# Patient Record
Sex: MALE | Race: WHITE
[De-identification: names, ages, dates, MRNs, and addresses within clinical notes are randomized per-mention and may not be internally consistent; named-entity substitution may affect disease eponyms.]

---

## 2019-01-02 ENCOUNTER — HOSPITAL ENCOUNTER (EMERGENCY)
Dept: HOSPITAL 10 - E/R | Age: 39
Discharge: HOME | End: 2019-01-02
Payer: MEDICAID

## 2019-01-02 ENCOUNTER — HOSPITAL ENCOUNTER (EMERGENCY)
Dept: HOSPITAL 91 - E/R | Age: 39
Discharge: HOME | End: 2019-01-02
Payer: MEDICAID

## 2019-01-02 VITALS
HEIGHT: 60 IN | WEIGHT: 132.28 LBS | BODY MASS INDEX: 25.97 KG/M2 | HEIGHT: 60 IN | WEIGHT: 132.28 LBS | BODY MASS INDEX: 25.97 KG/M2

## 2019-01-02 VITALS — SYSTOLIC BLOOD PRESSURE: 137 MMHG | RESPIRATION RATE: 16 BRPM | HEART RATE: 97 BPM | DIASTOLIC BLOOD PRESSURE: 83 MMHG

## 2019-01-02 DIAGNOSIS — R40.2142: ICD-10-CM

## 2019-01-02 DIAGNOSIS — F17.210: ICD-10-CM

## 2019-01-02 DIAGNOSIS — R40.2362: ICD-10-CM

## 2019-01-02 DIAGNOSIS — F10.20: ICD-10-CM

## 2019-01-02 DIAGNOSIS — F41.9: ICD-10-CM

## 2019-01-02 DIAGNOSIS — R00.0: Primary | ICD-10-CM

## 2019-01-02 DIAGNOSIS — R40.2252: ICD-10-CM

## 2019-01-02 LAB
ADD MAN DIFF?: NO
ALANINE AMINOTRANSFERASE: 25 IU/L (ref 13–69)
ALBUMIN/GLOBULIN RATIO: 1.82
ALBUMIN: 5.3 G/DL (ref 3.3–4.9)
ALKALINE PHOSPHATASE: 65 IU/L (ref 42–121)
ANION GAP: 15 (ref 5–13)
ASPARTATE AMINO TRANSFERASE: 24 IU/L (ref 15–46)
BASOPHIL #: 0 10^3/UL (ref 0–0.1)
BASOPHILS %: 0.3 % (ref 0–2)
BILIRUBIN,DIRECT: 0 MG/DL (ref 0–0.2)
BILIRUBIN,TOTAL: 0.4 MG/DL (ref 0.2–1.3)
BLOOD UREA NITROGEN: 17 MG/DL (ref 7–20)
CALCIUM: 9.9 MG/DL (ref 8.4–10.2)
CARBON DIOXIDE: 29 MMOL/L (ref 21–31)
CHLORIDE: 103 MMOL/L (ref 97–110)
CREATININE: 0.72 MG/DL (ref 0.61–1.24)
EOSINOPHILS #: 0.1 10^3/UL (ref 0–0.5)
EOSINOPHILS %: 1.3 % (ref 0–7)
GLOBULIN: 2.9 G/DL (ref 1.3–3.2)
GLUCOSE: 88 MG/DL (ref 70–220)
HEMATOCRIT: 44.5 % (ref 42–52)
HEMOGLOBIN: 15 G/DL (ref 14–18)
IMMATURE GRANS #M: 0.01 10^3/UL (ref 0–0.03)
IMMATURE GRANS % (M): 0.1 % (ref 0–0.43)
LYMPHOCYTES #: 1.3 10^3/UL (ref 0.8–2.9)
LYMPHOCYTES %: 18.1 % (ref 15–51)
MEAN CORPUSCULAR HEMOGLOBIN: 29.1 PG (ref 29–33)
MEAN CORPUSCULAR HGB CONC: 33.7 G/DL (ref 32–37)
MEAN CORPUSCULAR VOLUME: 86.4 FL (ref 82–101)
MEAN PLATELET VOLUME: 9.5 FL (ref 7.4–10.4)
MONOCYTE #: 0.4 10^3/UL (ref 0.3–0.9)
MONOCYTES %: 5.2 % (ref 0–11)
NEUTROPHIL #: 5.4 10^3/UL (ref 1.6–7.5)
NEUTROPHILS %: 75 % (ref 39–77)
NUCLEATED RED BLOOD CELLS #: 0 10^3/UL (ref 0–0)
NUCLEATED RED BLOOD CELLS%: 0 /100WBC (ref 0–0)
PLATELET COUNT: 276 10^3/UL (ref 140–415)
POTASSIUM: 4.2 MMOL/L (ref 3.5–5.1)
RED BLOOD COUNT: 5.15 10^6/UL (ref 4.7–6.1)
RED CELL DISTRIBUTION WIDTH: 12.6 % (ref 11.5–14.5)
SODIUM: 147 MMOL/L (ref 135–144)
TOTAL PROTEIN: 8.2 G/DL (ref 6.1–8.1)
TROPONIN-I: < 0.012 NG/ML (ref 0–0.12)
WHITE BLOOD COUNT: 7.2 10^3/UL (ref 4.8–10.8)

## 2019-01-02 PROCEDURE — 84484 ASSAY OF TROPONIN QUANT: CPT

## 2019-01-02 PROCEDURE — 36415 COLL VENOUS BLD VENIPUNCTURE: CPT

## 2019-01-02 PROCEDURE — 71045 X-RAY EXAM CHEST 1 VIEW: CPT

## 2019-01-02 PROCEDURE — 93005 ELECTROCARDIOGRAM TRACING: CPT

## 2019-01-02 PROCEDURE — 80053 COMPREHEN METABOLIC PANEL: CPT

## 2019-01-02 PROCEDURE — 85025 COMPLETE CBC W/AUTO DIFF WBC: CPT

## 2019-01-02 PROCEDURE — 99285 EMERGENCY DEPT VISIT HI MDM: CPT

## 2019-01-02 NOTE — ERD
ER Documentation


Chief Complaint


Chief Complaint





PALPITATIONS





HPI


This is a 38-year-old male with no significant past medical history who is 


presenting with palpitations.  The patient reports drinking several beers for 


New Year's Jennifer and for New Year's.  He does admit to alcohol intoxication last 


night.  When the patient woke up this morning he had a transient few minute 


episode of palpitations.  It quickly resolved, and the patient has not had any 


subsequent episodes.  The patient denies any chest pain or trouble breathing.  H


e denies lightheadedness or dizziness.  He denies nausea or vomiting.  The 


patient does also endorse mild anxiety related to stress in his life.  He does 


not elaborate further.





The patient denies feeling sick recently.  The patient denies fever or chills.  


The patient has had no headache or vision changes.  The patient does not endorse


neck or back pain.  The patient denies abdominal pain.  The patient denies 


changes to bowel movements or urination.  The patient has had no focal deficits.


 The patient has had no weakness or numbness or tingling to the face or 


extremities.





ROS


All systems reviewed and are negative except as per history of present illness.





Medications


Home Meds


Discontinued Scripts


Ondansetron Hcl* (Zofran*) 4 Mg Tablet, 4 MG PO Q8H PRN for NAUSEA AND/OR 


VOMITING, #30 TAB


   Prov:INDUJANAKGEOVANNI         11/29/18


Docusate Sodium* (Colace*) 100 Mg Capsule, 100 MG PO DAILY PRN for CONSTIPATION,


#30 CAP


   Prov:INDUJANAKGEOVANNI         11/29/18


Ibuprofen* (Motrin*) 600 Mg Tab, 600 MG PO Q6H PRN for PAIN AND OR ELEVATED 


TEMP, #30 TAB


   Prov:GEOVANNI MCGHEE         11/29/18


Lorazepam* (Lorazepam*) 1 Mg Tablet, 1 MG PO Q8H PRN for ANXIETY, #10 TAB


   Prov:SAKSHI ALTAMIRANO DO         5/7/18


Ondansetron (Ondansetron Odt) 4 Mg Tab.rapdis, 4 MG PO Q6H PRN for NAUSEA AND/OR


VOMITING, #10 TAB


   Prov:LEPEYTONOSRICHARDSTTYRAS A. DO         5/7/18





Allergies


Allergies:  


Coded Allergies:  


     No Known Allergy (Unverified , 1/2/19)





PMhx/Soc


Medical and Surgical Hx:  pt denies Medical Hx, pt denies Surgical Hx


History of Surgery:  No


Anesthesia Reaction:  No


Hx Neurological Disorder:  No


Hx Respiratory Disorders:  No


Hx Cardiac Disorders:  No


Hx Psychiatric Problems:  No


Hx Miscellaneous Medical Probl:  No


Hx Alcohol Use:  Yes (Occasional)


Hx Substance Use:  No


Hx Tobacco Use:  No


Smoking Status:  Current every day smoker





FmHx


Family History:  No diabetes





Physical Exam


Vitals





Vital Signs


  Date      Temp  Pulse  Resp  B/P (MAP)   Pulse Ox  O2          O2 Flow    FiO2


Time                                                 Delivery    Rate


    1/2/19           88    12      126/85        97  Room Air


     11:10                           (99)


    1/2/19  98.1    110    18      143/77        99


     10:18                           (99)





Physical Exam


Const:   No apparent distress, well-developed, well-nourished


Head:   Normocephalic, Atraumatic 


Eyes:   Normal Conjunctiva.  Extraocular movements intact. Pupils equal, round 


and reactive to light


ENT:   Normal External Ears, Nose and Mouth.


Neck:   Full range of motion. No meningismus.


Resp:   Clear to auscultation bilaterally, No wheezes, rales or rhonchi


Cardio:   Regular rate and rhythm. No murmurs, rubs or gallops


Abd:   Soft, non tender, non distended. Normal bowel sounds


Skin:   No petechiae or rashes


Back:   No midline tenderness. No CVA tenderness


Ext:   No cyanosis, or edema


Neur:   Awake and alert, oriented 4.  Cranial nerves intact.  No facial droop. 


Normal strength, sensation and coordination.


Psych:   Normal Mood and Affect


Result Diagram:  


1/2/19 1108                                                                     


          1/2/19 1108





Results 24 hrs





Laboratory Tests


              Test
                                  1/2/19
11:08


              White Blood Count                      7.2 10^3/ul


              Red Blood Count                       5.15 10^6/ul


              Hemoglobin                               15.0 g/dl


              Hematocrit                                  44.5 %


              Mean Corpuscular Volume                    86.4 fl


              Mean Corpuscular Hemoglobin                29.1 pg


              Mean Corpuscular Hemoglobin
Concent     33.7 g/dl 



              Red Cell Distribution Width                 12.6 %


              Platelet Count                         276 10^3/UL


              Mean Platelet Volume                        9.5 fl


              Immature Granulocytes %                    0.100 %


              Neutrophils %                               75.0 %


              Lymphocytes %                               18.1 %


              Monocytes %                                  5.2 %


              Eosinophils %                                1.3 %


              Basophils %                                  0.3 %


              Nucleated Red Blood Cells %            0.0 /100WBC


              Immature Granulocytes #              0.010 10^3/ul


              Neutrophils #                          5.4 10^3/ul


              Lymphocytes #                          1.3 10^3/ul


              Monocytes #                            0.4 10^3/ul


              Eosinophils #                          0.1 10^3/ul


              Basophils #                            0.0 10^3/ul


              Nucleated Red Blood Cells #            0.0 10^3/ul


              Sodium Level                            147 mmol/L


              Potassium Level                         4.2 mmol/L


              Chloride Level                          103 mmol/L


              Carbon Dioxide Level                     29 mmol/L


              Anion Gap                                       15


              Blood Urea Nitrogen                       17 mg/dl


              Creatinine                              0.72 mg/dl


              Est Glomerular Filtrat Rate
mL/min   > 60 mL/min 



              Glucose Level                             88 mg/dl


              Calcium Level                            9.9 mg/dl


              Total Bilirubin                          0.4 mg/dl


              Direct Bilirubin                        0.00 mg/dl


              Indirect Bilirubin                       0.4 mg/dl


              Aspartate Amino Transf
(AST/SGOT)         24 IU/L 



              Alanine Aminotransferase
(ALT/SGPT)       25 IU/L 



              Alkaline Phosphatase                       65 IU/L


              Troponin I                           < 0.012 ng/ml


              Total Protein                             8.2 g/dl


              Albumin                                   5.3 g/dl


              Globulin                                 2.90 g/dl


              Albumin/Globulin Ratio                        1.82








Procedures/MDM


MDM





The patient's presentation warrants further investigation. Previous medical 


records, if available, were reviewed.





LABS





The patient's laboratory testing was obtained and reviewed. No emergent 


treatment was required unless described below.





CBC:   No E/o of systemic infection or severe anemia or thrombocytopenia


CMP:   No E/o severe acidosis or alkalosis or renal failure or liver disease or 


diabetic ketoacidosis


Troponin:   No E/o acute ischemia





EKG





EKG read by me: 


Rate/Rhythm:    Regular rate and rhythm at a rate of 87 beats per minute


Intervals:    Normal


Axis:    Right axis deviation


Impression:    No evidence of acute ischemia or arrhythmia





IMAGING





Imaging and Radiology interpretation reviewed.





CXR


FINDINGS:


Support Hardware: None


Cardiovascular: The cardiovascular silhouette appears unremarkable.


Lung Fields: The lung fields appear clear with no nodule, alveolar infiltrate, 


or interstitial prominence evident.


Pleural Spaces: No pneumothorax or pleural effusion is identified.


Osseous Structures: The osseous structures appear intact.


Soft Tissues: The soft tissues appear unremarkable.


IMPRESSION: Unremarkable portable chest.


Electronically viewed and signed by Physician Jo-Ann on 01/02/2019 11:27 


 





TREATMENT/DISPOSITION





The patient presents with concerns of palpitations.  He endorses an 


uncomfortable feeling but no chest pain.  I suspect that this is related to his 


recent alcohol binge.  That said, his overall workup is reassuring and I do not 


see any symptoms concerning for emergent alcohol withdrawal.  The patient also 


endorses stress and anxiety over life events.  This could certainly be a 


possibility as well.  The patient was initially tachycardic, but it resolved on 


its own.  The patient's EKG and troponin are reassuring. I have low suspicion 


for acute coronary syndrome. I do not see evidence of any emergent cardiac 


arrhythmia, which includes but is not limited to heart block, Brugada syndrome 


or WPW. The patient has no heart murmurs or rales. There is no evidence of 


cardiomegaly on exam or chest xray. I have low suspicion for hypertrophic 


cardiomyopathy. I do not see evidence of CHF. The patient does not endorse any 


chest or pleuritic pain. The history is negative for bleeding or clotting 


disorders. The patient has not been involved in any recent prolonged trips or 


surgeries or hospitalizations. The patient has no calf tenderness or swelling. I


have decreased suspicion for PE as the etiology of symptoms.  The patient's 


chest xray does not reveal pneumonia or pneumothorax or pleural effusions or 


pulmonary edema. She does not have a widened mediastinum and does not have signs


or symptoms concerning for thoracic aortic aneurysm or dissection. The patient 


does not have pneumomediastinum or signs concerning for esophageal tear or 


rupture. The patient has no clinical or radiographic signs of pericardial 


effusion or tamponade.  The patient does not have pneumoperitoneum and I have 


decreased suspicion of viscus perforation as possible referred pain.





No emergent diagnoses were identified. At this time, I feel that the patient 


stable for discharge.  The patient was instructed to follow-up with a primary 


care physician in 1-3 days.  The patient will be given strict precautions with 


which to return to the emergency department.





Prescriptions: None





The patient's blood pressure was elevated at greater than 120/80 while in the 


emergency department.  The patient was otherwise stable with no evidence of 


hypertensive urgency or emergency.  The patient does not require admission for 


blood pressure control. I have discussed with the patient the risks of 


hypertension. I have instructed the patient to return to the ER for any new or 


worsening symptoms including chest pain, shortness of breath, headache, blurred 


vision, confusion, nausea, vomiting or LOC. I have advised the patient to follow


up with the primary care physician for outpatient monitoring and treatment for 


hypertension in 1-3 days.





Disclaimer: Inadvertent spelling and grammatical errors are likely due to 


EHR/dictation software use and do not reflect on the overall quality of patient 


care. Note that the electronic time recorded on this note does not necessarily 


reflect the actual time of the patient encounter.





Departure


Diagnosis:  


   Primary Impression:  


   Palpitations


   Additional Impressions:  


   Anxiousness


   Tachycardia


   Alcohol consumption binge drinking


Condition:  NILTON Pabon MD               Jan 2, 2019 12:24